# Patient Record
(demographics unavailable — no encounter records)

---

## 2025-06-29 NOTE — ASSESSMENT
[FreeTextEntry1] : EXAM Right elbow with reduced swelling, skin intact, no ecchymosis. Able to range elbow. Able to make fist, oppose thumb to small finger and abduct fingers. Sensation intact throughout. <2sec cap refill. RIght hip with mild swelling. In wheelchair, minimal ambulation. +TA, [facility], [place].   Right elbow radiographs demonstrate displaced olecranon fracture. (3-view) Pelvis radiographs with right  rami fracture.   ASSESSMENT/PLAN Right olecranon fracture AND right rami fracture - will continue to manage with closed management  Reviewed radiographs with patient/family and discussed pathoanatomy. In light of age, overall function, discussed alignment is within acceptable parameters to manage with closed management in brace.  Risk of weakness, pain, stiffness, malunion, nonunion, post-traumatic arthrosis, and displacement requiring further intervention. NWB, elevate, NSAIDs prn.  F/u 4-6weeks; reassess, repeat films

## 2025-06-29 NOTE — HISTORY OF PRESENT ILLNESS
[de-identified] : Age: 90 year F PMHx: HTN, HLD, Osteoporosis Hand Dominance: RHD Chief Complaint: Right elbow and right hip/pelvis pain s/p trauma 06/11/25. Patient had a mechanical fall while in her kitchen, slipping and landing onto her right elbow. Patient was seen at [facility] 06/11/25 where she had radiographs performed that confirmed a fracture in her right elbow. Patient also had radiographs performed of her right hip+pelvis which also showed a fracture, unsure of chronicity. Patient is currently receiving PT at a rehab facility 2x a day with relief. Patient currently in wheelchair. Denies numbness/tingling.  ***Accompanied by daughter*** Trauma: 06/11/25 Outside Imaging/Treatment: radiographs from [facility] 06/11/25 OTC Medications: IV tylenol OT/PT: rehab facility Bracing: none Pain worse with: exertion Pain better with: rest
[de-identified] : Age: 90 year F PMHx: HTN, HLD, Osteoporosis Hand Dominance: RHD Chief Complaint: Right elbow and right hip/pelvis pain s/p trauma 06/11/25. Patient had a mechanical fall while in her kitchen, slipping and landing onto her right elbow. Patient was seen at [facility] 06/11/25 where she had radiographs performed that confirmed a fracture in her right elbow. Patient also had radiographs performed of her right hip+pelvis which also showed a fracture, unsure of chronicity. Patient is currently receiving PT at a rehab facility 2x a day with relief. Patient currently in wheelchair. Denies numbness/tingling.  ***Accompanied by daughter*** Trauma: 06/11/25 Outside Imaging/Treatment: radiographs from [facility] 06/11/25 OTC Medications: IV tylenol OT/PT: rehab facility Bracing: none Pain worse with: exertion Pain better with: rest
[de-identified] : Age: 90 year F PMHx: HTN, HLD, Osteoporosis Hand Dominance: RHD Chief Complaint: Right elbow and right hip/pelvis pain s/p trauma 06/11/25. Patient had a mechanical fall while in her kitchen, slipping and landing onto her right elbow. Patient was seen at [facility] 06/11/25 where she had radiographs performed that confirmed a fracture in her right elbow. Patient also had radiographs performed of her right hip+pelvis which also showed a fracture, unsure of chronicity. Patient is currently receiving PT at a rehab facility 2x a day with relief. Patient currently in wheelchair. Denies numbness/tingling.  ***Accompanied by daughter*** Trauma: 06/11/25 Outside Imaging/Treatment: radiographs from [facility] 06/11/25 OTC Medications: IV tylenol OT/PT: rehab facility Bracing: none Pain worse with: exertion Pain better with: rest
(3) no apparent problem